# Patient Record
Sex: FEMALE
[De-identification: names, ages, dates, MRNs, and addresses within clinical notes are randomized per-mention and may not be internally consistent; named-entity substitution may affect disease eponyms.]

---

## 2024-03-26 ENCOUNTER — APPOINTMENT (OUTPATIENT)
Dept: OBGYN | Facility: CLINIC | Age: 24
End: 2024-03-26
Payer: COMMERCIAL

## 2024-03-26 VITALS
SYSTOLIC BLOOD PRESSURE: 121 MMHG | DIASTOLIC BLOOD PRESSURE: 84 MMHG | HEART RATE: 82 BPM | WEIGHT: 144 LBS | HEIGHT: 63 IN | OXYGEN SATURATION: 99 % | BODY MASS INDEX: 25.52 KG/M2

## 2024-03-26 DIAGNOSIS — Z01.419 ENCOUNTER FOR GYNECOLOGICAL EXAMINATION (GENERAL) (ROUTINE) W/OUT ABNORMAL FINDINGS: ICD-10-CM

## 2024-03-26 DIAGNOSIS — Z80.0 FAMILY HISTORY OF MALIGNANT NEOPLASM OF DIGESTIVE ORGANS: ICD-10-CM

## 2024-03-26 DIAGNOSIS — Z30.431 ENCOUNTER FOR ROUTINE CHECKING OF INTRAUTERINE CONTRACEPTIVE DEVICE: ICD-10-CM

## 2024-03-26 DIAGNOSIS — Z11.3 ENCOUNTER FOR SCREENING FOR INFECTIONS WITH A PREDOMINANTLY SEXUAL MODE OF TRANSMISSION: ICD-10-CM

## 2024-03-26 DIAGNOSIS — N93.9 ABNORMAL UTERINE AND VAGINAL BLEEDING, UNSPECIFIED: ICD-10-CM

## 2024-03-26 DIAGNOSIS — R10.2 PELVIC AND PERINEAL PAIN: ICD-10-CM

## 2024-03-26 PROBLEM — Z00.00 ENCOUNTER FOR PREVENTIVE HEALTH EXAMINATION: Status: ACTIVE | Noted: 2024-03-26

## 2024-03-26 PROCEDURE — 99213 OFFICE O/P EST LOW 20 MIN: CPT | Mod: 25

## 2024-03-26 PROCEDURE — 99385 PREV VISIT NEW AGE 18-39: CPT

## 2024-03-27 PROBLEM — Z80.0 FAMILY HISTORY OF PANCREATIC CANCER: Status: ACTIVE | Noted: 2024-03-27

## 2024-03-27 PROBLEM — Z30.431 CHECKING OF INTRAUTERINE DEVICE: Status: ACTIVE | Noted: 2024-03-27

## 2024-03-27 PROBLEM — Z01.419 ENCOUNTER FOR ANNUAL ROUTINE GYNECOLOGICAL EXAMINATION: Status: ACTIVE | Noted: 2024-03-26

## 2024-03-27 PROBLEM — N93.9 ABNORMAL UTERINE BLEEDING: Status: ACTIVE | Noted: 2024-03-27

## 2024-03-27 PROBLEM — R10.2 PELVIC CRAMPING: Status: ACTIVE | Noted: 2024-03-27

## 2024-03-27 LAB
C TRACH RRNA SPEC QL NAA+PROBE: NOT DETECTED
N GONORRHOEA RRNA SPEC QL NAA+PROBE: NOT DETECTED
SOURCE TP AMPLIFICATION: NORMAL

## 2024-03-27 NOTE — END OF VISIT
[FreeTextEntry3] : I, Mari Emerson, acted as a scribe on behalf of Dr. Key Reynolds M.D. on 03/26/2024.  All medical entries made by the scribe were at my, Dr. Key Reynolds M.D., direction and personally dictated by me on 03/26/2024. I have reviewed the chart and agree that the record accurately reflects my personal performance of the history, physical exam, assessment and plan. I have also personally directed, reviewed, and agreed with the chart.

## 2024-03-27 NOTE — PROCEDURE
[Liquid Base] : liquid base [Cervical Pap Smear] : cervical Pap smear [No Complications] : there were no complications [Tolerated Well] : the patient tolerated the procedure well [Transvaginal Ultrasound] : transvaginal ultrasound [Locate IUD] : locate IUD [FreeTextEntry3] : IUD in correct position, 1.63 x 2.07 cm simple cyst on left ovary

## 2024-03-27 NOTE — HISTORY OF PRESENT ILLNESS
[FreeTextEntry1] : 23 year old G0 female presenting for annual exam and evaluation of intermittent cramping and spotting. Reports last gyn check up was June 2023. She has a Kyleena IUD inserted June 2023.  She gets Monthly menses.  GYNHx: h/o Gardasil vaccine NKDA  She works as a consultant

## 2024-03-27 NOTE — PLAN
[FreeTextEntry1] : 23 year old female presents an annual and evaluation of pain/abnl bleeding, likely due to IUD, possible larger cyst previously  -US: IUD in correct position, 1.63 x 2.07 cm simple cyst on left ovary -PAP -Continue Kyleena -Pt advised to call with next episode of pain

## 2024-03-29 LAB — CYTOLOGY CVX/VAG DOC THIN PREP: NORMAL
